# Patient Record
Sex: MALE | Race: WHITE | NOT HISPANIC OR LATINO | URBAN - METROPOLITAN AREA
[De-identification: names, ages, dates, MRNs, and addresses within clinical notes are randomized per-mention and may not be internally consistent; named-entity substitution may affect disease eponyms.]

---

## 2018-10-10 ENCOUNTER — OFFICE VISIT (OUTPATIENT)
Dept: CARDIOLOGY | Facility: CLINIC | Age: 33
End: 2018-10-10

## 2018-10-10 VITALS
BODY MASS INDEX: 26.07 KG/M2 | HEART RATE: 59 BPM | WEIGHT: 176 LBS | HEIGHT: 69 IN | SYSTOLIC BLOOD PRESSURE: 122 MMHG | DIASTOLIC BLOOD PRESSURE: 80 MMHG

## 2018-10-10 DIAGNOSIS — R94.31 ABNORMAL EKG: Primary | ICD-10-CM

## 2018-10-10 PROCEDURE — 99202 OFFICE O/P NEW SF 15 MIN: CPT | Performed by: INTERNAL MEDICINE

## 2018-10-10 PROCEDURE — 93000 ELECTROCARDIOGRAM COMPLETE: CPT | Performed by: INTERNAL MEDICINE

## 2018-10-10 NOTE — PROGRESS NOTES
Date of Office Visit: 10/10/2018  Encounter Provider: Avel Murdock MD  Place of Service: [facility] CARDIOLOGY  Patient Name: Niraj Ye  :1985  Provider, No Known    Chief Complaint   Patient presents with   • Abnormal ECG     History of Present Illness    The patient is a 32-year-old healthy white male who was referred for evaluation after undergoing an examination for the [employer].  The EKG interpretation showed that the patient had essentially normal sinus rhythm with a short HI interval and some T-wave abnormalities.  Repeating the electrocardiogram here today shows that his HI interval is borderline short but his ST-T wave changes are not out of the realm of normal variant.    From a symptomatic standpoint he is completely asymptomatic.  He has never had any medical issues.  He does not take any medication.  He does not smoke nor does he drink.  There is a history of cardiac disease in his family.  Father had a pacemaker but there is no known history of coronary disease.       No past medical history on file.      No past surgical history on file.      No current outpatient prescriptions on file.      Social History     Social History   • Marital status: Single     Spouse name: N/A   • Number of children: N/A   • Years of education: N/A     Occupational History   • Not on file.     Social History Main Topics   • Smoking status: Never Smoker   • Smokeless tobacco: Never Used   • Alcohol use No   • Drug use: No   • Sexual activity: Defer     Other Topics Concern   • Not on file     Social History Narrative   • No narrative on file         Review of Systems   Constitution: Negative.   HENT: Negative.    Eyes: Negative.    Cardiovascular: Negative.    Respiratory: Negative.    Endocrine: Negative.    Skin: Negative.    Musculoskeletal: Negative.    Gastrointestinal: Negative.    Neurological: Negative.    Psychiatric/Behavioral: Negative.   "      Procedures      ECG 12 Lead  Date/Time: 10/10/2018 2:21 PM  Performed by: BERNICE MAGAÑA  Authorized by: BERNICE MAGAÑA   Comparison: compared with previous ECG from 9/19/2018  Rhythm: sinus rhythm  Rate: normal  Conduction: conduction normal  ST Segments: ST segments normal  QRS axis: normal  Comments: T-wave inversion in lead 3 which is considered to be normal variant.  In addition his his [place] interval is borderline.                Objective:    /80   Pulse 59   Ht 175.3 cm (69\")   Wt 79.8 kg (176 lb)   BMI 25.99 kg/m²         Physical Exam   Constitutional: He is oriented to person, place, and time. He appears well-developed and well-nourished.   HENT:   Head: Normocephalic.   Eyes: Pupils are equal, round, and reactive to light.   Neck: Normal range of motion. No JVD present. Carotid bruit is not present. No thyromegaly present.   Cardiovascular: Normal rate, regular rhythm, S1 normal, S2 normal, normal heart sounds and intact distal pulses.  Exam reveals no gallop and no friction rub.    No murmur heard.  Pulmonary/Chest: Effort normal and breath sounds normal.   Abdominal: Soft. Bowel sounds are normal.   Musculoskeletal: He exhibits no edema.   Neurological: He is alert and oriented to person, place, and time.   Skin: Skin is warm, dry and intact. No erythema.   Psychiatric: He has a normal mood and affect.   Vitals reviewed.          Assessment:   1.  Normal cardiac examination including electrocardiogram.  There is no reason to restrict this patient's activities in any manner.  He appears to be a healthy young male without cardiac issues.         Plan:         "